# Patient Record
Sex: FEMALE | Race: ASIAN | NOT HISPANIC OR LATINO
[De-identification: names, ages, dates, MRNs, and addresses within clinical notes are randomized per-mention and may not be internally consistent; named-entity substitution may affect disease eponyms.]

---

## 2024-04-02 ENCOUNTER — NON-APPOINTMENT (OUTPATIENT)
Age: 79
End: 2024-04-02

## 2024-04-03 ENCOUNTER — INPATIENT (INPATIENT)
Facility: HOSPITAL | Age: 79
LOS: 1 days | Discharge: HOME | End: 2024-04-05
Attending: HOSPITALIST | Admitting: HOSPITALIST
Payer: MEDICARE

## 2024-04-03 VITALS
OXYGEN SATURATION: 99 % | HEART RATE: 68 BPM | TEMPERATURE: 98 F | DIASTOLIC BLOOD PRESSURE: 91 MMHG | SYSTOLIC BLOOD PRESSURE: 149 MMHG | RESPIRATION RATE: 18 BRPM

## 2024-04-03 LAB
ALBUMIN SERPL ELPH-MCNC: 3.7 G/DL — SIGNIFICANT CHANGE UP (ref 3.3–5)
ALP SERPL-CCNC: 86 U/L — SIGNIFICANT CHANGE UP (ref 40–120)
ALT FLD-CCNC: 12 U/L — SIGNIFICANT CHANGE UP (ref 4–33)
ANION GAP SERPL CALC-SCNC: 11 MMOL/L — SIGNIFICANT CHANGE UP (ref 7–14)
AST SERPL-CCNC: 19 U/L — SIGNIFICANT CHANGE UP (ref 4–32)
BASOPHILS # BLD AUTO: 0.02 K/UL — SIGNIFICANT CHANGE UP (ref 0–0.2)
BASOPHILS NFR BLD AUTO: 0.5 % — SIGNIFICANT CHANGE UP (ref 0–2)
BILIRUB SERPL-MCNC: <0.2 MG/DL — SIGNIFICANT CHANGE UP (ref 0.2–1.2)
BUN SERPL-MCNC: 27 MG/DL — HIGH (ref 7–23)
CALCIUM SERPL-MCNC: 9.1 MG/DL — SIGNIFICANT CHANGE UP (ref 8.4–10.5)
CHLORIDE SERPL-SCNC: 106 MMOL/L — SIGNIFICANT CHANGE UP (ref 98–107)
CO2 SERPL-SCNC: 21 MMOL/L — LOW (ref 22–31)
CREAT SERPL-MCNC: 0.91 MG/DL — SIGNIFICANT CHANGE UP (ref 0.5–1.3)
EGFR: 65 ML/MIN/1.73M2 — SIGNIFICANT CHANGE UP
EOSINOPHIL # BLD AUTO: 0.06 K/UL — SIGNIFICANT CHANGE UP (ref 0–0.5)
EOSINOPHIL NFR BLD AUTO: 1.4 % — SIGNIFICANT CHANGE UP (ref 0–6)
GLUCOSE SERPL-MCNC: 96 MG/DL — SIGNIFICANT CHANGE UP (ref 70–99)
HCT VFR BLD CALC: 36.9 % — SIGNIFICANT CHANGE UP (ref 34.5–45)
HGB BLD-MCNC: 11.7 G/DL — SIGNIFICANT CHANGE UP (ref 11.5–15.5)
IANC: 1.72 K/UL — LOW (ref 1.8–7.4)
IMM GRANULOCYTES NFR BLD AUTO: 0.2 % — SIGNIFICANT CHANGE UP (ref 0–0.9)
LIDOCAIN IGE QN: 21 U/L — SIGNIFICANT CHANGE UP (ref 7–60)
LYMPHOCYTES # BLD AUTO: 2.26 K/UL — SIGNIFICANT CHANGE UP (ref 1–3.3)
LYMPHOCYTES # BLD AUTO: 51.7 % — HIGH (ref 13–44)
MCHC RBC-ENTMCNC: 28.5 PG — SIGNIFICANT CHANGE UP (ref 27–34)
MCHC RBC-ENTMCNC: 31.7 GM/DL — LOW (ref 32–36)
MCV RBC AUTO: 90 FL — SIGNIFICANT CHANGE UP (ref 80–100)
MONOCYTES # BLD AUTO: 0.3 K/UL — SIGNIFICANT CHANGE UP (ref 0–0.9)
MONOCYTES NFR BLD AUTO: 6.9 % — SIGNIFICANT CHANGE UP (ref 2–14)
NEUTROPHILS # BLD AUTO: 1.72 K/UL — LOW (ref 1.8–7.4)
NEUTROPHILS NFR BLD AUTO: 39.3 % — LOW (ref 43–77)
NRBC # BLD: 0 /100 WBCS — SIGNIFICANT CHANGE UP (ref 0–0)
NRBC # FLD: 0 K/UL — SIGNIFICANT CHANGE UP (ref 0–0)
NT-PROBNP SERPL-SCNC: 311 PG/ML — HIGH
PLATELET # BLD AUTO: 165 K/UL — SIGNIFICANT CHANGE UP (ref 150–400)
POTASSIUM SERPL-MCNC: 4.1 MMOL/L — SIGNIFICANT CHANGE UP (ref 3.5–5.3)
POTASSIUM SERPL-SCNC: 4.1 MMOL/L — SIGNIFICANT CHANGE UP (ref 3.5–5.3)
PROT SERPL-MCNC: 6.4 G/DL — SIGNIFICANT CHANGE UP (ref 6–8.3)
RBC # BLD: 4.1 M/UL — SIGNIFICANT CHANGE UP (ref 3.8–5.2)
RBC # FLD: 15.1 % — HIGH (ref 10.3–14.5)
SODIUM SERPL-SCNC: 138 MMOL/L — SIGNIFICANT CHANGE UP (ref 135–145)
T3 SERPL-MCNC: 87 NG/DL — SIGNIFICANT CHANGE UP (ref 80–200)
T4 AB SER-ACNC: 6.45 UG/DL — SIGNIFICANT CHANGE UP (ref 5.1–13)
TROPONIN T, HIGH SENSITIVITY RESULT: 12 NG/L — SIGNIFICANT CHANGE UP
TSH SERPL-MCNC: 0.44 UIU/ML — SIGNIFICANT CHANGE UP (ref 0.27–4.2)
WBC # BLD: 4.37 K/UL — SIGNIFICANT CHANGE UP (ref 3.8–10.5)
WBC # FLD AUTO: 4.37 K/UL — SIGNIFICANT CHANGE UP (ref 3.8–10.5)

## 2024-04-03 PROCEDURE — 71046 X-RAY EXAM CHEST 2 VIEWS: CPT | Mod: 26

## 2024-04-03 PROCEDURE — 99285 EMERGENCY DEPT VISIT HI MDM: CPT

## 2024-04-03 NOTE — ED ADULT TRIAGE NOTE - CHIEF COMPLAINT QUOTE
Pt. c/o chest pain and palpitations 4 days ago. Symptoms have improved but today had generalized weakness. Sent from urgent care for evaluation. Pmhx: HTN Pt. c/o chest pain and palpitations 4 days ago. Symptoms have improved but today had generalized weakness. Sent from urgent care for evaluation. Pmhx: HTN   given ASA with EMS

## 2024-04-03 NOTE — ED ADULT NURSE NOTE - OBJECTIVE STATEMENT
Pt arrives to room 27 A&Ox3 and ambulatory at baseline. Pt speaks english and Spanish, family at bedside for translation. PH of HTN. Pt comes to ED c/o palpitations. Pt states she was shopping at [employer] when she returned home in the afternoon, started experiencing palpitations that have since subsided. Pt endorsing dizziness and non-radiating mid epigastric pain. Denies headache, chest pain, SOB, fevers, chills, nausea, vomiting and diarrhea at this time. Respirations even and unlabored on room air. Pt placed on continuous cardiac monitor. whoactually tech notified of pt HR lola to 40's. MD Harley made aware. Repeat EKG performed at this time. Pt arrives with 20 gauge IV to L hand. Labs drawn and sent. Plan of care ongoing, comfort measures provided and safety measures maintained. Awaiting XR.

## 2024-04-03 NOTE — ED PROVIDER NOTE - ATTENDING CONTRIBUTION TO CARE
78yo F ho htn on norvasc pw intermittent palpitations and chest discomfort   feels like he heart is racing at times  currently without symptoms  ekg with sinus lola  will check labs, tsh, trop, reassess  pt HR at times jumps to 90s  will likely admit to tele for further eval

## 2024-04-03 NOTE — ED ADULT NURSE NOTE - CHIEF COMPLAINT QUOTE
Pt. c/o chest pain and palpitations 4 days ago. Symptoms have improved but today had generalized weakness. Sent from urgent care for evaluation. Pmhx: HTN   given ASA with EMS

## 2024-04-03 NOTE — ED PROVIDER NOTE - CLINICAL SUMMARY MEDICAL DECISION MAKING FREE TEXT BOX
79-year-old female past medical history of hypertension on Norvasc, presents to the ED complaining of intermittent palpitations and chest pressure.  States that she is woken up 3 times from her sleep with palpitations with associated chills on waking up.  Chest discomfort nonexertional, nonpleuritic. no alleviating or exacerbating factors. Denies chest pain, shortness of breath, fevers, chills, nausea, vomiting, diarrhea.  Denies associated diaphoresis.     VS stable, bradycardic to 50's BP normotensive. Clinically stable. EKG sinus lola w premature atrial complexes w no ST elevations or T wave inversions. PE, well appearing, no acute distress, AAOx3. NCAT, EOMI, normal conjunctiva, mucous membranes moist, LCTAB no w/r/c, no MRG, rate intermittently increasing to 90s while examining pt, subsequently decreasing to 50's but maintaining p waves, abd NDNT, no rebound tenderness or guarding, no CVA ttp, no focal neuro deficits, neurovascularly intact, dp 2+, no bruising, rashes, or erythema. Suspicion for sick sinus syndrome vs paroxysmal afib. will assess w cbc cmp trop bnp tsh cxr tsh. dispo likely admission to tele for EP consult.

## 2024-04-03 NOTE — ED ADULT TRIAGE NOTE - NS ED NURSE BANDS TYPE
Patient mother griselda Richardson) request xray results be sent to Dr. Fernando Sanon office. We didn't refer patient there and therefore a medical release would need to be completed before release of information. Ro Fishman will stop by office.     Phone # Dr. Herman Quezada is [phone]    No referral is required per Hayley/referrals
Name band;

## 2024-04-03 NOTE — ED PROVIDER NOTE - PHYSICAL EXAMINATION
Gen: AAOx3, non-toxic, no acute distress resting comfortably.   Head: NCAT  HEENT: EOMI, oral mucosa moist, normal conjunctiva  Lung: CTAB, no respiratory distress, no wheezes/rhonchi/rales B/L,   CV: no murmurs, rubs or gallops. rate intermittently increasing to 90s upon examination, maintaining P waves on monitor, then dropping to 50's.   Abd: soft, NTND, no guarding, no CVA tenderness  MSK: no visible deformities  Neuro: No focal sensory or motor deficits  Skin: Warm, well perfused, no rash  Psych: normal affect.

## 2024-04-03 NOTE — ED PROVIDER NOTE - OBJECTIVE STATEMENT
79-year-old female past medical history of hypertension on Norvasc, presents to the ED complaining of intermittent palpitations and chest pressure.  States that she is woken up 3 times from her sleep with palpitations with associated chills on waking up.  Chest discomfort nonexertional, nonpleuritic. no alleviating or exacerbating factors. Denies chest pain, shortness of breath, fevers, chills, nausea, vomiting, diarrhea.  Denies associated diaphoresis.

## 2024-04-04 ENCOUNTER — RESULT REVIEW (OUTPATIENT)
Age: 79
End: 2024-04-04

## 2024-04-04 DIAGNOSIS — R00.2 PALPITATIONS: ICD-10-CM

## 2024-04-04 DIAGNOSIS — I10 ESSENTIAL (PRIMARY) HYPERTENSION: ICD-10-CM

## 2024-04-04 LAB
ALBUMIN SERPL ELPH-MCNC: 3.9 G/DL — SIGNIFICANT CHANGE UP (ref 3.3–5)
ALP SERPL-CCNC: 77 U/L — SIGNIFICANT CHANGE UP (ref 40–120)
ALT FLD-CCNC: 8 U/L — SIGNIFICANT CHANGE UP (ref 4–33)
ANION GAP SERPL CALC-SCNC: 12 MMOL/L — SIGNIFICANT CHANGE UP (ref 7–14)
APTT BLD: 30.9 SEC — SIGNIFICANT CHANGE UP (ref 24.5–35.6)
AST SERPL-CCNC: 20 U/L — SIGNIFICANT CHANGE UP (ref 4–32)
BASOPHILS # BLD AUTO: 0.02 K/UL — SIGNIFICANT CHANGE UP (ref 0–0.2)
BASOPHILS NFR BLD AUTO: 0.4 % — SIGNIFICANT CHANGE UP (ref 0–2)
BILIRUB SERPL-MCNC: <0.2 MG/DL — SIGNIFICANT CHANGE UP (ref 0.2–1.2)
BLD GP AB SCN SERPL QL: NEGATIVE — SIGNIFICANT CHANGE UP
BUN SERPL-MCNC: 26 MG/DL — HIGH (ref 7–23)
CALCIUM SERPL-MCNC: 9.4 MG/DL — SIGNIFICANT CHANGE UP (ref 8.4–10.5)
CHLORIDE SERPL-SCNC: 108 MMOL/L — HIGH (ref 98–107)
CO2 SERPL-SCNC: 20 MMOL/L — LOW (ref 22–31)
CREAT SERPL-MCNC: 0.66 MG/DL — SIGNIFICANT CHANGE UP (ref 0.5–1.3)
EGFR: 90 ML/MIN/1.73M2 — SIGNIFICANT CHANGE UP
EOSINOPHIL # BLD AUTO: 0.08 K/UL — SIGNIFICANT CHANGE UP (ref 0–0.5)
EOSINOPHIL NFR BLD AUTO: 1.5 % — SIGNIFICANT CHANGE UP (ref 0–6)
GLUCOSE SERPL-MCNC: 91 MG/DL — SIGNIFICANT CHANGE UP (ref 70–99)
HCT VFR BLD CALC: 37.8 % — SIGNIFICANT CHANGE UP (ref 34.5–45)
HGB BLD-MCNC: 12 G/DL — SIGNIFICANT CHANGE UP (ref 11.5–15.5)
IANC: 2.17 K/UL — SIGNIFICANT CHANGE UP (ref 1.8–7.4)
IMM GRANULOCYTES NFR BLD AUTO: 0.4 % — SIGNIFICANT CHANGE UP (ref 0–0.9)
INR BLD: 1.03 RATIO — SIGNIFICANT CHANGE UP (ref 0.85–1.18)
LYMPHOCYTES # BLD AUTO: 2.8 K/UL — SIGNIFICANT CHANGE UP (ref 1–3.3)
LYMPHOCYTES # BLD AUTO: 51.5 % — HIGH (ref 13–44)
MAGNESIUM SERPL-MCNC: 2.3 MG/DL — SIGNIFICANT CHANGE UP (ref 1.6–2.6)
MCHC RBC-ENTMCNC: 28.2 PG — SIGNIFICANT CHANGE UP (ref 27–34)
MCHC RBC-ENTMCNC: 31.7 GM/DL — LOW (ref 32–36)
MCV RBC AUTO: 88.9 FL — SIGNIFICANT CHANGE UP (ref 80–100)
MONOCYTES # BLD AUTO: 0.35 K/UL — SIGNIFICANT CHANGE UP (ref 0–0.9)
MONOCYTES NFR BLD AUTO: 6.4 % — SIGNIFICANT CHANGE UP (ref 2–14)
NEUTROPHILS # BLD AUTO: 2.17 K/UL — SIGNIFICANT CHANGE UP (ref 1.8–7.4)
NEUTROPHILS NFR BLD AUTO: 39.8 % — LOW (ref 43–77)
NRBC # BLD: 0 /100 WBCS — SIGNIFICANT CHANGE UP (ref 0–0)
NRBC # FLD: 0 K/UL — SIGNIFICANT CHANGE UP (ref 0–0)
PHOSPHATE SERPL-MCNC: 3.2 MG/DL — SIGNIFICANT CHANGE UP (ref 2.5–4.5)
PLATELET # BLD AUTO: 174 K/UL — SIGNIFICANT CHANGE UP (ref 150–400)
POTASSIUM SERPL-MCNC: 4.3 MMOL/L — SIGNIFICANT CHANGE UP (ref 3.5–5.3)
POTASSIUM SERPL-SCNC: 4.3 MMOL/L — SIGNIFICANT CHANGE UP (ref 3.5–5.3)
PROT SERPL-MCNC: 6.8 G/DL — SIGNIFICANT CHANGE UP (ref 6–8.3)
PROTHROM AB SERPL-ACNC: 11.6 SEC — SIGNIFICANT CHANGE UP (ref 9.5–13)
RBC # BLD: 4.25 M/UL — SIGNIFICANT CHANGE UP (ref 3.8–5.2)
RBC # FLD: 15.3 % — HIGH (ref 10.3–14.5)
RH IG SCN BLD-IMP: POSITIVE — SIGNIFICANT CHANGE UP
SODIUM SERPL-SCNC: 140 MMOL/L — SIGNIFICANT CHANGE UP (ref 135–145)
TROPONIN T, HIGH SENSITIVITY RESULT: 12 NG/L — SIGNIFICANT CHANGE UP
WBC # BLD: 5.44 K/UL — SIGNIFICANT CHANGE UP (ref 3.8–10.5)
WBC # FLD AUTO: 5.44 K/UL — SIGNIFICANT CHANGE UP (ref 3.8–10.5)

## 2024-04-04 PROCEDURE — 93306 TTE W/DOPPLER COMPLETE: CPT | Mod: 26

## 2024-04-04 PROCEDURE — 99223 1ST HOSP IP/OBS HIGH 75: CPT

## 2024-04-04 RX ORDER — ASPIRIN/CALCIUM CARB/MAGNESIUM 324 MG
324 TABLET ORAL ONCE
Refills: 0 | Status: COMPLETED | OUTPATIENT
Start: 2024-04-04 | End: 2024-04-04

## 2024-04-04 RX ORDER — AMLODIPINE BESYLATE 2.5 MG/1
5 TABLET ORAL DAILY
Refills: 0 | Status: DISCONTINUED | OUTPATIENT
Start: 2024-04-04 | End: 2024-04-05

## 2024-04-04 RX ORDER — AMLODIPINE BESYLATE 2.5 MG/1
1 TABLET ORAL
Refills: 0 | DISCHARGE

## 2024-04-04 RX ORDER — HEPARIN SODIUM 5000 [USP'U]/ML
5000 INJECTION INTRAVENOUS; SUBCUTANEOUS EVERY 12 HOURS
Refills: 0 | Status: DISCONTINUED | OUTPATIENT
Start: 2024-04-04 | End: 2024-04-05

## 2024-04-04 RX ORDER — ACETAMINOPHEN 500 MG
650 TABLET ORAL EVERY 6 HOURS
Refills: 0 | Status: DISCONTINUED | OUTPATIENT
Start: 2024-04-04 | End: 2024-04-05

## 2024-04-04 RX ORDER — ATORVASTATIN CALCIUM 80 MG/1
20 TABLET, FILM COATED ORAL AT BEDTIME
Refills: 0 | Status: DISCONTINUED | OUTPATIENT
Start: 2024-04-04 | End: 2024-04-05

## 2024-04-04 RX ADMIN — Medication 650 MILLIGRAM(S): at 15:20

## 2024-04-04 RX ADMIN — ATORVASTATIN CALCIUM 20 MILLIGRAM(S): 80 TABLET, FILM COATED ORAL at 00:45

## 2024-04-04 RX ADMIN — HEPARIN SODIUM 5000 UNIT(S): 5000 INJECTION INTRAVENOUS; SUBCUTANEOUS at 06:25

## 2024-04-04 RX ADMIN — HEPARIN SODIUM 5000 UNIT(S): 5000 INJECTION INTRAVENOUS; SUBCUTANEOUS at 18:16

## 2024-04-04 RX ADMIN — ATORVASTATIN CALCIUM 20 MILLIGRAM(S): 80 TABLET, FILM COATED ORAL at 21:46

## 2024-04-04 RX ADMIN — AMLODIPINE BESYLATE 5 MILLIGRAM(S): 2.5 TABLET ORAL at 06:25

## 2024-04-04 RX ADMIN — Medication 324 MILLIGRAM(S): at 00:45

## 2024-04-04 RX ADMIN — Medication 650 MILLIGRAM(S): at 14:52

## 2024-04-04 NOTE — CONSULT NOTE ADULT - SUBJECTIVE AND OBJECTIVE BOX
C A R D I O L O G Y  *********************    DATE OF SERVICE: 04-04-24    HISTORY OF PRESENT ILLNESS: HPI:   Pt is a 79-year-old female hx HTN, hyperthyroidism prior (now resolved), here w/ palpitations and chest pressure.     Patient was at urgent care yesterday and was send in for bradycardia and chest pressure. She also reports palpations at the same time. Reports associated dizziness and lightheadedness. Denies any syncope, reports bradycardia and dry skin. Chest pressure has now resolved, no associated SOB, orthopnea, PND or LE edema      PAST MEDICAL & SURGICAL HISTORY:  HTN  H/O hyperthyroidism  No significant past surgical history      MEDICATIONS:  MEDICATIONS  (STANDING):  amLODIPine   Tablet 5 milliGRAM(s) Oral daily  atorvastatin 20 milliGRAM(s) Oral at bedtime  heparin   Injectable 5000 Unit(s) SubCutaneous every 12 hours      Allergies:No Known Allergies    FAMILY HISTORY: No pertinent family history in first degree relatives    SOCIAL HISTORY:    [X ] Non-smoker  [ ] Smoker  [ ] Alcohol    FLU VACCINE THIS YEAR STARTS IN AUGUST:  [ ] Yes    [ ] No    IF OVER 65 HAVE YOU EVER HAD A PNA VACCINE:  [ ] Yes    [ ] No       [ ] N/A      REVIEW OF SYSTEMS:  [ ]chest pain  [  ]shortness of breath  [  ]palpitations  [  ]syncope  [ ]near syncope [ ]upper extremity weakness   [ ] lower extremity weakness  [  ]diplopia  [  ]altered mental status   [  ]fevers  [ ]chills [ ]nausea  [ ]vomiting  [  ]dysphagia    [ ]abdominal pain  [ ]melena  [ ]BRBPR    [  ]epistaxis  [  ]rash    [ ]lower extremity edema    [X] All others negative	  [ ] Unable to obtain      LABS:	 	    CARDIAC MARKERS:                    12.0   5.44  )-----------( 174      ( 04 Apr 2024 06:31 )             37.8     Hb Trend: 12.0<--, 11.7<--    04-04    140  |  108<H>  |  26<H>  ----------------------------<  91  4.3   |  20<L>  |  0.66    Ca    9.4      04 Apr 2024 06:31  Phos  3.2     04-04  Mg     2.30     04-04    TPro  6.8  /  Alb  3.9  /  TBili  <0.2  /  DBili  x   /  AST  20  /  ALT  8   /  AlkPhos  77  04-04    Creatinine Trend: 0.66<--, 0.91<--    Coags:  PT/INR - ( 04 Apr 2024 06:31 )   PT: 11.6 sec;   INR: 1.03 ratio         PTT - ( 04 Apr 2024 06:31 )  PTT:30.9 sec  TSH: Thyroid Stimulating Hormone, Serum: 0.44 uIU/mL (04-03 @ 22:08)          PHYSICAL EXAM:  T(C): 36.7 (04-04-24 @ 09:41), Max: 37.1 (04-03-24 @ 21:08)  HR: 52 (04-04-24 @ 09:41) (46 - 68)  BP: 114/54 (04-04-24 @ 09:41) (114/54 - 157/71)  RR: 17 (04-04-24 @ 09:41) (16 - 18)  SpO2: 96% (04-04-24 @ 09:41) (96% - 100%)  Wt(kg): --     I&O's Summary    General:  Alert and Oriented * 3.   Head: Normocephalic and atraumatic.   Neck: No JVD. No bruits. Supple. Does not appear to be enlarged.   Cardiovascular: + S1,S2 ; RRR Soft systolic murmur at the left lower sternal border. No rubs noted.    Lungs: CTA b/l. No rhonchi, rales or wheezes.   Abdomen: + BS, soft. Non tender. Non distended. No rebound. No guarding.   Extremities: No clubbing/cyanosis/edema.   Neurologic: Moves all four extremities. Full range of motion.   Skin: Warm and moist. Psychiatric: Appropriate mood and affect.  Musculoskeletal: Normal range of motion, normal strength     TELEMETRY: 	  Sinus lola with PAC    ECG:  	Sinus with PAC    RADIOLOGY:         CXR:   < from: Xray Chest 2 Views PA/Lat (04.03.24 @ 22:42) >  FINDINGS:  Heart/Vascular: Heart size is enlarged.  Pulmonary: The lungs are clear. No pleural effusion. No pneumothorax.  Bones: No acute osseous abnormalities    IMPRESSION:  Clear lungs.    < end of copied text >      ASSESSMENT/PLAN:  Pt is a 79-year-old female hx HTN, hyperthyroidism prior (now resolved), here w/ palpitations and chest pressure. Patient was at urgent care yesterday and was send in for bradycardia and chest pressure. She also reports palpations at the same time. Reports associated dizziness and lightheadedness. Denies any syncope, reports bradycardia and dry skin. Chest pressure has now resolved, no associated SOB, orthopnea, PND or LE edema      Chest Pressure/Dizziness/Bradycardia/Palpiations  - EKG with sinus lola and PAC  - Tele with sinus lola and PAC  - TSH WNL  - Appropriate HR response to activity  - check TTE  - check orthostats  - check NST  - outpatient [facility]    Sarika Valentin MD  Pager: [phone]      C A R D I O L O G Y  *********************    DATE OF SERVICE: 04-04-24    HISTORY OF PRESENT ILLNESS: HPI:   Pt is a 79-year-old female hx HTN, hyperthyroidism prior (now resolved), here w/ palpitations and chest pressure.     Patient was at urgent care yesterday and was send in for bradycardia and chest pressure. She also reports palpations at the same time. Reports associated dizziness and lightheadedness. Denies any syncope, reports bradycardia and dry skin. Chest pressure has now resolved, no associated SOB, orthopnea, PND or LE edema      PAST MEDICAL & SURGICAL HISTORY:  HTN  H/O hyperthyroidism  No significant past surgical history      MEDICATIONS:  MEDICATIONS  (STANDING):  amLODIPine   Tablet 5 milliGRAM(s) Oral daily  atorvastatin 20 milliGRAM(s) Oral at bedtime  heparin   Injectable 5000 Unit(s) SubCutaneous every 12 hours      Allergies:No Known Allergies    FAMILY HISTORY: No pertinent family history in first degree relatives    SOCIAL HISTORY:    [X ] Non-smoker  [ ] Smoker  [ ] Alcohol    FLU VACCINE THIS YEAR STARTS IN AUGUST:  [ ] Yes    [ ] No    IF OVER 65 HAVE YOU EVER HAD A PNA VACCINE:  [ ] Yes    [ ] No       [ ] N/A      REVIEW OF SYSTEMS:  [ ]chest pain  [  ]shortness of breath  [  ]palpitations  [  ]syncope  [ ]near syncope [ ]upper extremity weakness   [ ] lower extremity weakness  [  ]diplopia  [  ]altered mental status   [  ]fevers  [ ]chills [ ]nausea  [ ]vomiting  [  ]dysphagia    [ ]abdominal pain  [ ]melena  [ ]BRBPR    [  ]epistaxis  [  ]rash    [ ]lower extremity edema    [X] All others negative	  [ ] Unable to obtain      LABS:	 	    CARDIAC MARKERS:                    12.0   5.44  )-----------( 174      ( 04 Apr 2024 06:31 )             37.8     Hb Trend: 12.0<--, 11.7<--    04-04    140  |  108<H>  |  26<H>  ----------------------------<  91  4.3   |  20<L>  |  0.66    Ca    9.4      04 Apr 2024 06:31  Phos  3.2     04-04  Mg     2.30     04-04    TPro  6.8  /  Alb  3.9  /  TBili  <0.2  /  DBili  x   /  AST  20  /  ALT  8   /  AlkPhos  77  04-04    Creatinine Trend: 0.66<--, 0.91<--    Coags:  PT/INR - ( 04 Apr 2024 06:31 )   PT: 11.6 sec;   INR: 1.03 ratio         PTT - ( 04 Apr 2024 06:31 )  PTT:30.9 sec  TSH: Thyroid Stimulating Hormone, Serum: 0.44 uIU/mL (04-03 @ 22:08)          PHYSICAL EXAM:  T(C): 36.7 (04-04-24 @ 09:41), Max: 37.1 (04-03-24 @ 21:08)  HR: 52 (04-04-24 @ 09:41) (46 - 68)  BP: 114/54 (04-04-24 @ 09:41) (114/54 - 157/71)  RR: 17 (04-04-24 @ 09:41) (16 - 18)  SpO2: 96% (04-04-24 @ 09:41) (96% - 100%)  Wt(kg): --     I&O's Summary    General:  Alert and Oriented * 3.   Head: Normocephalic and atraumatic.   Neck: No JVD. No bruits. Supple. Does not appear to be enlarged.   Cardiovascular: + S1,S2 ; RRR Soft systolic murmur at the left lower sternal border. No rubs noted.    Lungs: CTA b/l. No rhonchi, rales or wheezes.   Abdomen: + BS, soft. Non tender. Non distended. No rebound. No guarding.   Extremities: No clubbing/cyanosis/edema.   Neurologic: Moves all four extremities. Full range of motion.   Skin: Warm and moist. Psychiatric: Appropriate mood and affect.  Musculoskeletal: Normal range of motion, normal strength     TELEMETRY: 	  Sinus lola with PAC    ECG:  	Sinus with PAC    RADIOLOGY:         CXR:   < from: Xray Chest 2 Views PA/Lat (04.03.24 @ 22:42) >  FINDINGS:  Heart/Vascular: Heart size is enlarged.  Pulmonary: The lungs are clear. No pleural effusion. No pneumothorax.  Bones: No acute osseous abnormalities    IMPRESSION:  Clear lungs.    < end of copied text >      ASSESSMENT/PLAN:  Pt is a 79-year-old female hx HTN, hyperthyroidism prior (now resolved), here w/ palpitations and chest pressure. Patient was at urgent care yesterday and was send in for bradycardia and chest pressure. She also reports palpations at the same time. Reports associated dizziness and lightheadedness. Denies any syncope, reports bradycardia and dry skin. Chest pressure has now resolved, no associated SOB, orthopnea, PND or LE edema      Chest Pressure/Dizziness/Bradycardia/Palpiations/HTN  - EKG with sinus lola and PAC  - Tele with sinus lola and PAC  - TSH WNL  - Appropriate HR response to activity  - check TTE  - check orthostats  - check NST  - outpatient [facility]  - c/w Valsartan, HCTZ and Amlodipine for BP    Sarika Valentin MD  Pager: [phone]      C A R D I O L O G Y  *********************    DATE OF SERVICE: 04-04-24    HISTORY OF PRESENT ILLNESS: HPI:   Pt is a 79-year-old female hx HTN, hyperthyroidism prior (now resolved), here w/ palpitations and chest pressure.     Patient was at urgent care yesterday and was send in for bradycardia and chest pressure. She also reports palpations at the same time. Reports associated dizziness and lightheadedness. Denies any syncope, reports bradycardia and dry skin. Chest pressure has now resolved, no associated SOB, orthopnea, PND or LE edema      PAST MEDICAL & SURGICAL HISTORY:  HTN  H/O hyperthyroidism  No significant past surgical history      MEDICATIONS:  MEDICATIONS  (STANDING):  amLODIPine   Tablet 5 milliGRAM(s) Oral daily  atorvastatin 20 milliGRAM(s) Oral at bedtime  heparin   Injectable 5000 Unit(s) SubCutaneous every 12 hours      Allergies:No Known Allergies    FAMILY HISTORY: No pertinent family history in first degree relatives    SOCIAL HISTORY:    [X ] Non-smoker  [ ] Smoker  [ ] Alcohol    FLU VACCINE THIS YEAR STARTS IN AUGUST:  [ ] Yes    [ ] No    IF OVER 65 HAVE YOU EVER HAD A PNA VACCINE:  [ ] Yes    [ ] No       [ ] N/A      REVIEW OF SYSTEMS:  [ ]chest pain  [  ]shortness of breath  [  ]palpitations  [  ]syncope  [ ]near syncope [ ]upper extremity weakness   [ ] lower extremity weakness  [  ]diplopia  [  ]altered mental status   [  ]fevers  [ ]chills [ ]nausea  [ ]vomiting  [  ]dysphagia    [ ]abdominal pain  [ ]melena  [ ]BRBPR    [  ]epistaxis  [  ]rash    [ ]lower extremity edema    [X] All others negative	  [ ] Unable to obtain      LABS:	 	    CARDIAC MARKERS:                    12.0   5.44  )-----------( 174      ( 04 Apr 2024 06:31 )             37.8     Hb Trend: 12.0<--, 11.7<--    04-04    140  |  108<H>  |  26<H>  ----------------------------<  91  4.3   |  20<L>  |  0.66    Ca    9.4      04 Apr 2024 06:31  Phos  3.2     04-04  Mg     2.30     04-04    TPro  6.8  /  Alb  3.9  /  TBili  <0.2  /  DBili  x   /  AST  20  /  ALT  8   /  AlkPhos  77  04-04    Creatinine Trend: 0.66<--, 0.91<--    Coags:  PT/INR - ( 04 Apr 2024 06:31 )   PT: 11.6 sec;   INR: 1.03 ratio         PTT - ( 04 Apr 2024 06:31 )  PTT:30.9 sec  TSH: Thyroid Stimulating Hormone, Serum: 0.44 uIU/mL (04-03 @ 22:08)          PHYSICAL EXAM:  T(C): 36.7 (04-04-24 @ 09:41), Max: 37.1 (04-03-24 @ 21:08)  HR: 52 (04-04-24 @ 09:41) (46 - 68)  BP: 114/54 (04-04-24 @ 09:41) (114/54 - 157/71)  RR: 17 (04-04-24 @ 09:41) (16 - 18)  SpO2: 96% (04-04-24 @ 09:41) (96% - 100%)  Wt(kg): --     I&O's Summary    General:  Alert and Oriented * 3.   Head: Normocephalic and atraumatic.   Neck: No JVD. No bruits. Supple. Does not appear to be enlarged.   Cardiovascular: + S1,S2 ; RRR Soft systolic murmur at the left lower sternal border. No rubs noted.    Lungs: CTA b/l. No rhonchi, rales or wheezes.   Abdomen: + BS, soft. Non tender. Non distended. No rebound. No guarding.   Extremities: No clubbing/cyanosis/edema.   Neurologic: Moves all four extremities. Full range of motion.   Skin: Warm and moist. Psychiatric: Appropriate mood and affect.  Musculoskeletal: Normal range of motion, normal strength     TELEMETRY: 	  Sinus lola with PAC    ECG:  	Sinus with PAC    RADIOLOGY:         CXR:   < from: Xray Chest 2 Views PA/Lat (04.03.24 @ 22:42) >  FINDINGS:  Heart/Vascular: Heart size is enlarged.  Pulmonary: The lungs are clear. No pleural effusion. No pneumothorax.  Bones: No acute osseous abnormalities    IMPRESSION:  Clear lungs.    < end of copied text >      ASSESSMENT/PLAN:  Pt is a 79-year-old female hx HTN, hyperthyroidism prior (now resolved), here w/ palpitations and chest pressure. Patient was at urgent care yesterday and was send in for bradycardia and chest pressure. She also reports palpations at the same time. Reports associated dizziness and lightheadedness. Denies any syncope, reports bradycardia and dry skin. Chest pressure has now resolved, no associated SOB, orthopnea, PND or LE edema      Chest Pressure/Dizziness/Bradycardia/Palpiations/HTN/HLD  - EKG with sinus lola and PAC  - Tele with sinus lola and PAC  - TSH WNL  - Appropriate HR response to activity  - check TTE  - check orthostats  - check NST  - outpatient [facility]  - c/w Valsartan, HCTZ and Amlodipine for BP  - c/w Statin    Sarika Valentin MD  Pager: [phone]

## 2024-04-04 NOTE — CONSULT NOTE ADULT - SUBJECTIVE AND OBJECTIVE BOX
Abdominal Pain   WHAT YOU NEED TO KNOW:   Abdominal pain can be dull, achy, or sharp  You may have pain in one area of your abdomen, or in your entire abdomen  Your pain may be caused by a condition such as constipation, food sensitivity or poisoning, infection, or a blockage  Abdominal pain can also be from a hernia, appendicitis, or an ulcer  Liver, gallbladder, or kidney conditions can also cause abdominal pain  The cause of your abdominal pain may be unknown  DISCHARGE INSTRUCTIONS:   Return to the emergency department if:   · You have new chest pain or shortness of breath  · You have pulsing pain in your upper abdomen or lower back that suddenly becomes constant  · Your pain is in the right lower abdominal area and worsens with movement  · You have a fever over 100 4°F (38°C) or shaking chills  · You are vomiting and cannot keep food or liquids down  · Your pain does not improve or gets worse over the next 8 to 12 hours  · You see blood in your vomit or bowel movements, or they look black and tarry  · Your skin or the whites of your eyes turn yellow  · You are a woman and have a large amount of vaginal bleeding that is not your monthly period  Contact your healthcare provider if:   · You have pain in your lower back  · You are a man and have pain in your testicles  · You have pain when you urinate  · You have questions or concerns about your condition or care  Follow up with your healthcare provider within 24 hours or as directed:  Write down your questions so you remember to ask them during your visits  Medicines:   · Medicines  may be given to calm your stomach and prevent vomiting or to decrease pain  Ask how to take pain medicine safely  · Take your medicine as directed  Contact your healthcare provider if you think your medicine is not helping or if you have side effects  Tell him of her if you are allergic to any medicine   Keep a list of the medicines, EP Attending  HISTORY OF PRESENT ILLNESS: HPI:   79-year-old female hx HTN, hyperthyroidism prior (now resolved), here w/ palpitations and chest pressure. Patient had this happen 10 days ago and 4 days ago. Has been waking up due to palpitations. She had mild chest pressure that lasted <15 minutes (4 d ago) that was left sided. ROS is otherwise negative.  ED course: EKG sinus bradycardic w/ Qtc prolongation 478, HR ranged 40s-low 50s. Troponin negative. TSH/t4 wnl.   (04 Apr 2024 04:23)    Patient aware of pounding slow heartbeat. No fainting or lightheadedness but does feel fatigued.    Takes medication for hypertension but is not on any diltiazem or beta blockers.  Not listed as taking any thyroid-suppressive RX, and her thyroid function is not abnormal.  A 10 pt ROS is otherwise negative.    PAST MEDICAL & SURGICAL HISTORY:  Mild HTN  H/O hyperthyroidism  No significant past surgical history      MEDICATIONS  (STANDING):  amLODIPine   Tablet 5 milliGRAM(s) Oral daily  atorvastatin 20 milliGRAM(s) Oral at bedtime  heparin   Injectable 5000 Unit(s) SubCutaneous every 12 hours    Allergies    No Known Allergies    Intolerances    FAMILY HISTORY:  No pertinent family history in first degree relatives      Non-contributary for premature coronary disease or sudden cardiac death    SOCIAL HISTORY:    [x ] Non-smoker  [ ] Smoker  [ ] Alcohol      PHYSICAL EXAM:  T(C): 36.7 (04-04-24 @ 09:41), Max: 37.1 (04-03-24 @ 21:08)  HR: 52 (04-04-24 @ 09:41) (46 - 68)  BP: 114/54 (04-04-24 @ 09:41) (114/54 - 157/71)  RR: 17 (04-04-24 @ 09:41) (16 - 18)  SpO2: 96% (04-04-24 @ 09:41) (96% - 100%)  Wt(kg): --    Appearance: elderly woman in no acute distress	  HEENT:   Normal oral mucosa, PERRL, EOMI	  Lymphatic: No lymphadenopathy , no edema  Cardiovascular:  slow regular S1 S2, No JVD, No murmurs , Peripheral pulses palpable 2+ bilaterally  Respiratory: Lungs clear to auscultation, normal effort 	  Gastrointestinal:  Soft, Non-tender, + BS	  Skin: No rashes, No ecchymoses, No cyanosis, warm to touch  Musculoskeletal: Normal range of motion, normal strength  Psychiatry:  Mood & affect appropriate      TELEMETRY: sinus lola 45-55bpm 	    ECG: sinus lola 50bpm.  QT upper limits of normal.  short burst of PAT.  Echo:    1. Left ventricular cavity is normal in size. Left ventricular wall thickness is normal. Left ventricular systolic function is normal with an ejection fraction of 65 % by Farr's method of disks. There are no regional wall motion abnormalities seen.   2. Normal right ventricular cavity size and probably normal systolic function.   3. Structurally normal mitral valve with normal leaflet excursion. There is calcification of the mitral valve annulus. There is mild to moderate mitral regurgitation.   4. The left atrium is moderately dilated with an indexed volume of 47 ml/m².    ________________________________________________________________________________________  FINDINGS:     Left Ventricle:  The left ventricular cavity is normal in size. Left ventricular wall thickness is normal. Left ventricular systolic function is normal with a calculated ejection fraction of 65 % by the Farr's biplane method of disks. There are no regional wall motion abnormalities seen.     Right Ventricle:  The right ventricle is not well visualized. The right ventricular cavity is normal in size and probably normal systolic function. Tricuspid annular plane systolic excursion (TAPSE) is 1.9 cm (normal >=1.7 cm).     Left Atrium:  The left atrium is moderately dilated with an indexed volume of 47 ml/m².     Right Atrium:  The right atrium is dilated with an indexed area of 9.51 cm²/m².     Aortic Valve:  The aortic valve appears trileaflet with normal systolic excursion. There is calcification of the aortic valve leaflets. There is no evidence of aortic regurgitation.     Mitral Valve:  Structurally normal mitral valve with normal leaflet excursion. There is calcification of the mitral valve annulus. There is mild to moderate mitral regurgitation.     Tricuspid Valve:  Structurally normal tricuspid valve with normal leaflet excursion. There is mild tricuspid regurgitation.     Pulmonic Valve:  Structurally normal pulmonic valve with normal leaflet excursion. There is trace pulmonic regurgitation.     Aorta:  The aortic root at the sinuses of Valsalva is normal in size, measuring 2.90 cm (indexed 1.65 cm/m²). The ascending aorta diameter is dilated, measuring 4.00 cm (indexed 2.27 cm/m²).     Pericardium:  No pericardial effusion seen.     Systemic Veins:  The inferior vena cava was not well visualized.    LABS:	 	                          12.0   5.44  )-----------( 174      ( 04 Apr 2024 06:31 )             37.8     04-04    140  |  108<H>  |  26<H>  ----------------------------<  91  4.3   |  20<L>  |  0.66    Ca    9.4      04 Apr 2024 06:31  Phos  3.2     04-04  Mg     2.30     04-04    TPro  6.8  /  Alb  3.9  /  TBili  <0.2  /  DBili  x   /  AST  20  /  ALT  8   /  AlkPhos  77  04-04  TSH: Thyroid Stimulating Hormone, Serum: 0.44 uIU/mL (04-03 @ 22:08)      ASSESSMENT/PLAN: Ms Phillips is a pleasant 79y Female brought in for chest pain and low heartrate.  NOT having an acute MI, by troponins x 2.  EKG without ischemic changes.  Hx of hyperthyroidism, resolved, TSH/T4/T3 all normal now.  No sustained arrhythmia on telemetry.  With ambulation, HR goes as high as 60, albeit this was a slow walk on flat ground.  Avoid AV angel blockers.  Continue her home ARB re: dilated ascending aorta and hypertension.    Ambulate w/ physical therapy on telemetry: unclear if her heartrate is limiting her activity (symptomatic sinus node dysfunction).  I have not brought up the possibility of dual chamber pacemaker implant yet, pending an exercise eval and completion of rest of cardiac testing.  Stress testing pending per general cardiology.    Fredo Kothari M.D.  Cardiac Electrophysiology    office [phone]  pager [phone] vitamins, and herbs you take  Include the amounts, and when and why you take them  Bring the list or the pill bottles to follow-up visits  Carry your medicine list with you in case of an emergency  © 2017 [address] [employer] Information is for End User's use only and may not be sold, redistributed or otherwise used for commercial purposes  All illustrations and images included in CareNotes® are the copyrighted property of A D A M , Inc  or [employer]  The above information is an  only  It is not intended as medical advice for individual conditions or treatments  Talk to your doctor, nurse or pharmacist before following any medical regimen to see if it is safe and effective for you

## 2024-04-04 NOTE — PHARMACOTHERAPY INTERVENTION NOTE - COMMENTS
Medication history is saved as incomplete. Spoke with daughter (Rody) unsure of patient's home medication names, unable to verify meds.   She reports patient is taking one medication for HTN (doesn't know the name) and when asked if taking any medications for HLD; reports no ?   Pharmacy shows recent fill history for following meds:  - Amlodipine 10mg once daily   - Valsartan/HCTZ 160/12.5mg once daily  - Crestor 20mg once daily

## 2024-04-04 NOTE — H&P ADULT - NSHPLABSRESULTS_GEN_ALL_CORE
.  LABS:                         11.7   4.37  )-----------( 165      ( 03 Apr 2024 22:08 )             36.9     04-03    138  |  106  |  27<H>  ----------------------------<  96  4.1   |  21<L>  |  0.91    Ca    9.1      03 Apr 2024 22:08    TPro  6.4  /  Alb  3.7  /  TBili  <0.2  /  DBili  x   /  AST  19  /  ALT  12  /  AlkPhos  86  04-03      Urinalysis Basic - ( 03 Apr 2024 22:08 )    Color: x / Appearance: x / SG: x / pH: x  Gluc: 96 mg/dL / Ketone: x  / Bili: x / Urobili: x   Blood: x / Protein: x / Nitrite: x   Leuk Esterase: x / RBC: x / WBC x   Sq Epi: x / Non Sq Epi: x / Bacteria: x                RADIOLOGY, EKG & ADDITIONAL TESTS: Reviewed.

## 2024-04-04 NOTE — H&P ADULT - NSHPPHYSICALEXAM_GEN_ALL_CORE
PHYSICAL EXAM:  GENERAL: NAD, well-developed  HEAD:  Atraumatic, Normocephalic  EYES: EOMI, PERRLA, conjunctiva and sclera clear  NECK: Supple, No JVD  CHEST/LUNG: Clear to auscultation bilaterally; No wheeze  HEART: Bradycardic; No murmurs, rubs, or gallops  ABDOMEN: Soft, Nontender, Nondistended; Bowel sounds present  EXTREMITIES:  2+ Peripheral Pulses, No clubbing, cyanosis, or edema  PSYCH: AAOx3  NEUROLOGY: non-focal  SKIN: No rashes or lesions

## 2024-04-04 NOTE — H&P ADULT - ASSESSMENT
79-year-old female hx HTN, hyperthyroidism prior (now resolved), here w/ palpitations and chest pressure.

## 2024-04-04 NOTE — H&P ADULT - HISTORY OF PRESENT ILLNESS
79-year-old female hx HTN, hyperthyroidism prior (now resolved), here w/ palpitations and chest pressure. Patient had this happen 10 days ago and 4 days ago. Has been waking up due to palpitations. She had mild chest pressure that lasted <15 minutes (4 d ago) that was left sided. ROS is otherwise negative.    ED course: EKG sinus bradycardic w/ Qtc prolongation 478, HR ranged 40s-low 50s. Troponin negative. TSH/t4 wnl.

## 2024-04-04 NOTE — ED ADULT NURSE REASSESSMENT NOTE - NS ED NURSE REASSESS COMMENT FT1
break coverage rn. received report from ANGUS acosta. pt A&Ox4, per primary RN, report given to LSRG ANGUS Del Valle. safety maintained. noted to be afib on the monitor. denies complaitns at this time

## 2024-04-05 ENCOUNTER — TRANSCRIPTION ENCOUNTER (OUTPATIENT)
Age: 79
End: 2024-04-05

## 2024-04-05 ENCOUNTER — RESULT REVIEW (OUTPATIENT)
Age: 79
End: 2024-04-05

## 2024-04-05 VITALS
SYSTOLIC BLOOD PRESSURE: 151 MMHG | DIASTOLIC BLOOD PRESSURE: 70 MMHG | TEMPERATURE: 98 F | OXYGEN SATURATION: 100 % | HEART RATE: 59 BPM | RESPIRATION RATE: 17 BRPM

## 2024-04-05 LAB
ANION GAP SERPL CALC-SCNC: 13 MMOL/L — SIGNIFICANT CHANGE UP (ref 7–14)
BASOPHILS # BLD AUTO: 0.03 K/UL — SIGNIFICANT CHANGE UP (ref 0–0.2)
BASOPHILS NFR BLD AUTO: 0.5 % — SIGNIFICANT CHANGE UP (ref 0–2)
BUN SERPL-MCNC: 25 MG/DL — HIGH (ref 7–23)
CALCIUM SERPL-MCNC: 9.2 MG/DL — SIGNIFICANT CHANGE UP (ref 8.4–10.5)
CHLORIDE SERPL-SCNC: 107 MMOL/L — SIGNIFICANT CHANGE UP (ref 98–107)
CO2 SERPL-SCNC: 21 MMOL/L — LOW (ref 22–31)
CREAT SERPL-MCNC: 0.64 MG/DL — SIGNIFICANT CHANGE UP (ref 0.5–1.3)
EGFR: 91 ML/MIN/1.73M2 — SIGNIFICANT CHANGE UP
EOSINOPHIL # BLD AUTO: 0.07 K/UL — SIGNIFICANT CHANGE UP (ref 0–0.5)
EOSINOPHIL NFR BLD AUTO: 1.3 % — SIGNIFICANT CHANGE UP (ref 0–6)
GLUCOSE SERPL-MCNC: 93 MG/DL — SIGNIFICANT CHANGE UP (ref 70–99)
HCT VFR BLD CALC: 38 % — SIGNIFICANT CHANGE UP (ref 34.5–45)
HCV AB S/CO SERPL IA: 0.08 S/CO — SIGNIFICANT CHANGE UP (ref 0–0.99)
HCV AB SERPL-IMP: SIGNIFICANT CHANGE UP
HGB BLD-MCNC: 12.4 G/DL — SIGNIFICANT CHANGE UP (ref 11.5–15.5)
IANC: 2.4 K/UL — SIGNIFICANT CHANGE UP (ref 1.8–7.4)
IMM GRANULOCYTES NFR BLD AUTO: 0.4 % — SIGNIFICANT CHANGE UP (ref 0–0.9)
LYMPHOCYTES # BLD AUTO: 2.66 K/UL — SIGNIFICANT CHANGE UP (ref 1–3.3)
LYMPHOCYTES # BLD AUTO: 47.8 % — HIGH (ref 13–44)
MAGNESIUM SERPL-MCNC: 2.3 MG/DL — SIGNIFICANT CHANGE UP (ref 1.6–2.6)
MCHC RBC-ENTMCNC: 29 PG — SIGNIFICANT CHANGE UP (ref 27–34)
MCHC RBC-ENTMCNC: 32.6 GM/DL — SIGNIFICANT CHANGE UP (ref 32–36)
MCV RBC AUTO: 88.8 FL — SIGNIFICANT CHANGE UP (ref 80–100)
MONOCYTES # BLD AUTO: 0.38 K/UL — SIGNIFICANT CHANGE UP (ref 0–0.9)
MONOCYTES NFR BLD AUTO: 6.8 % — SIGNIFICANT CHANGE UP (ref 2–14)
NEUTROPHILS # BLD AUTO: 2.4 K/UL — SIGNIFICANT CHANGE UP (ref 1.8–7.4)
NEUTROPHILS NFR BLD AUTO: 43.2 % — SIGNIFICANT CHANGE UP (ref 43–77)
NRBC # BLD: 0 /100 WBCS — SIGNIFICANT CHANGE UP (ref 0–0)
NRBC # FLD: 0 K/UL — SIGNIFICANT CHANGE UP (ref 0–0)
PHOSPHATE SERPL-MCNC: 2.8 MG/DL — SIGNIFICANT CHANGE UP (ref 2.5–4.5)
PLATELET # BLD AUTO: 171 K/UL — SIGNIFICANT CHANGE UP (ref 150–400)
POTASSIUM SERPL-MCNC: 4.2 MMOL/L — SIGNIFICANT CHANGE UP (ref 3.5–5.3)
POTASSIUM SERPL-SCNC: 4.2 MMOL/L — SIGNIFICANT CHANGE UP (ref 3.5–5.3)
RBC # BLD: 4.28 M/UL — SIGNIFICANT CHANGE UP (ref 3.8–5.2)
RBC # FLD: 14.9 % — HIGH (ref 10.3–14.5)
SODIUM SERPL-SCNC: 141 MMOL/L — SIGNIFICANT CHANGE UP (ref 135–145)
WBC # BLD: 5.56 K/UL — SIGNIFICANT CHANGE UP (ref 3.8–10.5)
WBC # FLD AUTO: 5.56 K/UL — SIGNIFICANT CHANGE UP (ref 3.8–10.5)

## 2024-04-05 PROCEDURE — 93016 CV STRESS TEST SUPVJ ONLY: CPT | Mod: GC

## 2024-04-05 PROCEDURE — 78451 HT MUSCLE IMAGE SPECT SING: CPT | Mod: 26

## 2024-04-05 PROCEDURE — 93018 CV STRESS TEST I&R ONLY: CPT | Mod: GC

## 2024-04-05 RX ORDER — ATORVASTATIN CALCIUM 80 MG/1
1 TABLET, FILM COATED ORAL
Qty: 0 | Refills: 0 | DISCHARGE
Start: 2024-04-05

## 2024-04-05 RX ORDER — ATORVASTATIN CALCIUM 80 MG/1
1 TABLET, FILM COATED ORAL
Qty: 30 | Refills: 0
Start: 2024-04-05 | End: 2024-05-04

## 2024-04-05 RX ADMIN — HEPARIN SODIUM 5000 UNIT(S): 5000 INJECTION INTRAVENOUS; SUBCUTANEOUS at 05:37

## 2024-04-05 RX ADMIN — AMLODIPINE BESYLATE 5 MILLIGRAM(S): 2.5 TABLET ORAL at 05:36

## 2024-04-05 NOTE — PROGRESS NOTE ADULT - SUBJECTIVE AND OBJECTIVE BOX
DATE OF SERVICE: 04-05-24    Patient denies chest pain or shortness of breath.   Review of symptoms otherwise negative.    MEDICATIONS:  acetaminophen     Tablet .. 650 milliGRAM(s) Oral every 6 hours PRN  amLODIPine   Tablet 5 milliGRAM(s) Oral daily  atorvastatin 20 milliGRAM(s) Oral at bedtime  heparin   Injectable 5000 Unit(s) SubCutaneous every 12 hours      LABS:                        12.4   5.56  )-----------( 171      ( 05 Apr 2024 05:45 )             38.0       Hemoglobin: 12.4 g/dL (04-05 @ 05:45)  Hemoglobin: 12.0 g/dL (04-04 @ 06:31)  Hemoglobin: 11.7 g/dL (04-03 @ 22:08)      04-05    141  |  107  |  25<H>  ----------------------------<  93  4.2   |  21<L>  |  0.64    Ca    9.2      05 Apr 2024 05:45  Phos  2.8     04-05  Mg     2.30     04-05    TPro  6.8  /  Alb  3.9  /  TBili  <0.2  /  DBili  x   /  AST  20  /  ALT  8   /  AlkPhos  77  04-04    Creatinine Trend: 0.64<--, 0.66<--, 0.91<--    COAGS:           PHYSICAL EXAM:  T(C): 36.8 (04-05-24 @ 09:39), Max: 37.1 (04-04-24 @ 20:00)  HR: 57 (04-05-24 @ 09:39) (53 - 57)  BP: 133/59 (04-05-24 @ 09:39) (124/59 - 153/53)  RR: 18 (04-05-24 @ 09:39) (17 - 18)  SpO2: 96% (04-05-24 @ 09:39) (96% - 98%)  Wt(kg): --    I&O's Summary      General:  Alert and Oriented * 3.   Head: Normocephalic and atraumatic.   Neck: No JVD. No bruits. Supple. Does not appear to be enlarged.   Cardiovascular: + S1,S2 ; RRR Soft systolic murmur at the left lower sternal border. No rubs noted.    Lungs: CTA b/l. No rhonchi, rales or wheezes.   Abdomen: + BS, soft. Non tender. Non distended. No rebound. No guarding.   Extremities: No clubbing/cyanosis/edema.   Neurologic: Moves all four extremities. Full range of motion.   Skin: Warm and moist. The patient's skin has normal elasticity and good skin turgor.   Psychiatric: Appropriate mood and affect.  Musculoskeletal: Normal range of motion, normal strength       < from: TTE W or WO Ultrasound Enhancing Agent (04.04.24 @ 10:14) >  CONCLUSIONS:      1. Left ventricular cavity is normal in size. Left ventricular wall thickness is normal. Left ventricular systolic function is normal with an ejection fraction of 65 % by Farr's method of disks. There are no regional wall motion abnormalities seen.   2. Normal right ventricular cavity size and probably normal systolic function.   3. Structurally normal mitral valve with normal leaflet excursion. There is calcification of the mitral valve annulus. There is mild to moderate mitral regurgitation.   4. The left atrium is moderately dilated with an indexed volume of 47 ml/m².    < end of copied text >      < from: Nuclear Stress Test-Pharmacologic.. (04.05.24 @ 08:23) >  --------------------------------------------------------------------------------------------------------------------------------------------------------Conclusions:   1. Normal myocardial perfusion scan, with no evidence of infarction or inducible ischemia.   2. The time activity curve suggests diastolic dysfunction.. The post stress left ventricular EF is 77 %. The stress end diastolic volume is 64 ml and systolic volume is 14 ml.    < end of copied text >      TELEMETRY: 	  Sinus lola with PAC    ECG:  	Sinus with PAC    RADIOLOGY:         CXR:   < from: Xray Chest 2 Views PA/Lat (04.03.24 @ 22:42) >  FINDINGS:  Heart/Vascular: Heart size is enlarged.  Pulmonary: The lungs are clear. No pleural effusion. No pneumothorax.  Bones: No acute osseous abnormalities    IMPRESSION:  Clear lungs.    < end of copied text >      ASSESSMENT/PLAN:  Pt is a 79-year-old female hx HTN, hyperthyroidism prior (now resolved), here w/ palpitations and chest pressure. Patient was at urgent care yesterday and was send in for bradycardia and chest pressure. She also reports palpations at the same time. Reports associated dizziness and lightheadedness. Denies any syncope, reports bradycardia and dry skin. Chest pressure has now resolved, no associated SOB, orthopnea, PND or LE edema      Chest Pressure/Dizziness/Bradycardia/Palpiations/HTN/HLD  - EKG with sinus lola and PAC  - Tele with sinus lola and PAC  - TSH WNL  - Appropriate HR response to activity  - TTE normal LVEF and no RWMA  - orthostats negative  - NST with no perfusion deficits  - outpatient [facility]  - c/w Valsartan, HCTZ and Amlodipine for BP  - c/w Statin    Sarika Valentin MD  Pager: [phone]   
EP ATTENDING    tele: NSR with APCs, periods of sinus bradycardia, no significant events, no atrial fibrillation    palpitations and chest pain improving, denies syncope    DATE OF SERVICE - 04-05-24    Review of Systems:   Constitutional: [ ] fevers, [ ] chills.   Skin: [ ] dry skin. [ ] rashes.  Psychiatric: [ ] depression, [ ] anxiety.   Gastrointestinal: [ ] BRBPR, [ ] melena.   Neurological: [ ] confusion. [ ] seizures. [ ] shuffling gait.   Ears,Nose,Mouth and Throat: [ ] ear pain [ ] sore throat.   Eyes: [ ] diplopia.   Respiratory: [ ] hemoptysis. [ ] shortness of breath  Cardiovascular: See HPI above  Hematologic/Lymphatic: [ ] anemia. [ ] painful nodes. [ ] prolonged bleeding.   Genitourinary: [ ] hematuria. [ ] flank pain.   Endocrine: [ ] significant change in weight. [ ] intolerance to heat and cold.     Review of systems [ x] otherwise negative, [ ] otherwise unable to obtain    FH: no family history of sudden cardiac death in first degree relatives    SH: [ ] tobacco, [ ] alcohol, [ ] drugs    acetaminophen     Tablet .. 650 milliGRAM(s) Oral every 6 hours PRN  amLODIPine   Tablet 5 milliGRAM(s) Oral daily  atorvastatin 20 milliGRAM(s) Oral at bedtime  heparin   Injectable 5000 Unit(s) SubCutaneous every 12 hours                            12.4   5.56  )-----------( 171      ( 05 Apr 2024 05:45 )             38.0       04-05    141  |  107  |  25<H>  ----------------------------<  93  4.2   |  21<L>  |  0.64    Ca    9.2      05 Apr 2024 05:45  Phos  2.8     04-05  Mg     2.30     04-05    TPro  6.8  /  Alb  3.9  /  TBili  <0.2  /  DBili  x   /  AST  20  /  ALT  8   /  AlkPhos  77  04-04            T(C): 36.8 (04-05-24 @ 09:39), Max: 37.1 (04-04-24 @ 20:00)  HR: 57 (04-05-24 @ 09:39) (53 - 57)  BP: 133/59 (04-05-24 @ 09:39) (124/59 - 153/53)  RR: 18 (04-05-24 @ 09:39) (17 - 18)  SpO2: 96% (04-05-24 @ 09:39) (96% - 98%)  Wt(kg): --    I&O's Summary      General: Well nourished in no acute distress. Alert and Oriented * 3.   Head: Normocephalic and atraumatic.   Neck: No JVD. No bruits. Supple. Does not appear to be enlarged.   Cardiovascular: + S1,S2 ; RRR Soft systolic murmur at the left lower sternal border. No rubs noted.    Lungs: CTA b/l. No rhonchi, rales or wheezes.   Abdomen: + BS, soft. Non tender. Non distended. No rebound. No guarding.   Extremities: No clubbing/cyanosis/edema.   Neurologic: Moves all four extremities. Full range of motion.   Skin: Warm and moist. The patient's skin has normal elasticity and good skin turgor.   Psychiatric: Appropriate mood and affect.  Musculoskeletal: Normal range of motion, normal strength        echo unremarkable LVEF    A/P) 80 y/o female PMH hypertension and hyperthyroidism a/w palpitations and presyncope. EP called for sinus bradycardia - patient denies presyncope, syncope, nor exercise intolerance. LVEF remains normal and QRS remains narrow    -continue norvasc for hypertension  -continue lipitor for hyperlipidemia  -no obvious indication for PPM at this time  -workup of chest pain as per cardiology      Kirt Jackson M.D., [facility]  Cardiac Electrophysiology  [facility]  [address] [place] [address], [address] [place] [address]  [email or web address]    office [phone]  pager [phone]  
Patient is a 79y old  Female who presents with a chief complaint of palpitations (05 Apr 2024 14:29)    Date of servie : 04-05-24 @ 16:34  INTERVAL HPI/OVERNIGHT EVENTS:  T(C): 36.9 (04-05-24 @ 13:35), Max: 37.1 (04-04-24 @ 20:00)  HR: 59 (04-05-24 @ 13:35) (53 - 59)  BP: 151/70 (04-05-24 @ 13:35) (124/59 - 153/53)  RR: 17 (04-05-24 @ 13:35) (17 - 18)  SpO2: 100% (04-05-24 @ 13:35) (96% - 100%)  Wt(kg): --  I&O's Summary      LABS:                        12.4   5.56  )-----------( 171      ( 05 Apr 2024 05:45 )             38.0     04-05    141  |  107  |  25<H>  ----------------------------<  93  4.2   |  21<L>  |  0.64    Ca    9.2      05 Apr 2024 05:45  Phos  2.8     04-05  Mg     2.30     04-05    TPro  6.8  /  Alb  3.9  /  TBili  <0.2  /  DBili  x   /  AST  20  /  ALT  8   /  AlkPhos  77  04-04    PT/INR - ( 04 Apr 2024 06:31 )   PT: 11.6 sec;   INR: 1.03 ratio         PTT - ( 04 Apr 2024 06:31 )  PTT:30.9 sec  Urinalysis Basic - ( 05 Apr 2024 05:45 )    Color: x / Appearance: x / SG: x / pH: x  Gluc: 93 mg/dL / Ketone: x  / Bili: x / Urobili: x   Blood: x / Protein: x / Nitrite: x   Leuk Esterase: x / RBC: x / WBC x   Sq Epi: x / Non Sq Epi: x / Bacteria: x      CAPILLARY BLOOD GLUCOSE            Urinalysis Basic - ( 05 Apr 2024 05:45 )    Color: x / Appearance: x / SG: x / pH: x  Gluc: 93 mg/dL / Ketone: x  / Bili: x / Urobili: x   Blood: x / Protein: x / Nitrite: x   Leuk Esterase: x / RBC: x / WBC x   Sq Epi: x / Non Sq Epi: x / Bacteria: x        MEDICATIONS  (STANDING):  amLODIPine   Tablet 5 milliGRAM(s) Oral daily  atorvastatin 20 milliGRAM(s) Oral at bedtime  heparin   Injectable 5000 Unit(s) SubCutaneous every 12 hours    MEDICATIONS  (PRN):  acetaminophen     Tablet .. 650 milliGRAM(s) Oral every 6 hours PRN Temp greater or equal to 38C (100.4F), Mild Pain (1 - 3)          PHYSICAL EXAM:  GENERAL: NAD, well-groomed, well-developed  HEAD:  Atraumatic, Normocephalic  CHEST/LUNG: Clear to percussion bilaterally; No rales, rhonchi, wheezing, or rubs  HEART: Regular rate and rhythm; No murmurs, rubs, or gallops  ABDOMEN: Soft, Nontender, Nondistended; Bowel sounds present  EXTREMITIES:  2+ Peripheral Pulses, No clubbing, cyanosis, or edema  LYMPH: No lymphadenopathy noted  SKIN: No rashes or lesions    Care Discussed with Consultants/Other Providers [ ] YES  [ ] NO

## 2024-04-05 NOTE — CHART NOTE - NSCHARTNOTEFT_GEN_A_CORE
[facility] NIGHT MEDICINE COVERAGE.    Notified by RN, pt sustaining bradycardic 40-50s while asleep throughout the night. Now notified that tele is showing pt may be in Afib. EKG ordered and personally reviewed showing Irregular rhythm [facility] [employer]IC[employer].    Notified by RN, pt sustaining bradycardic 40-50s while asleep throughout the night. Now notified that tele is showing pt may be in Afib. EKG ordered and interpreted as AFib with Slow RVR to 58bpm. EKG personally reviewed showing bradycardia however with p-waves present. Unlikely AFib, however EP, Attg, Dr. SHERRY Kothari made aware, waiting recs. If likelihood of AFib, pts [id number]: 4 (for age, sex, hx of HTN), would rec AC.     Emilie MATTSON  [facility] [phone] [facility] NIGHT MEDICINE COVERAGE.    Notified by RN, pt sustaining bradycardic 40-50s while asleep throughout the night. Now notified that tele is showing pt may be in Afib. EKG ordered and interpreted as AFib with Slow RVR to 58bpm. EKG personally reviewed showing bradycardia however with p-waves present. Unlikely AFib, however EP, Attg, Dr. SHERRY Kothari made aware, waiting recs. If likelihood of AFib, pts [id number]: 4 (for age, sex, hx of HTN), would rec AC.     Per Dr. SHERRY Kothari, pt still in Sinus Nabeel with short PATs, no need for AC as not in AFib. Nothing further to do. c/w Tele.     Emilie MATTSON  [facility] Medicine [phone]

## 2024-04-05 NOTE — DISCHARGE NOTE PROVIDER - NSDCFUADDAPPT_GEN_ALL_CORE_FT
Kirt Jackson M.D., [facility]  Cardiac Electrophysiology  [facility]  [address] [facility], [address] [place], [place] [id number]  [email or web address]    office [phone] Kirt Jackson M.D., [facility]  Cardiac Electrophysiology  [facility]  [address] [place] [address], [address] [place] [address]  [email or web address]    office [phone]   Or Follow up with your Doctor from [facility] , call for appointment

## 2024-04-05 NOTE — PROGRESS NOTE ADULT - ASSESSMENT
79-year-old female hx HTN, hyperthyroidism prior (now resolved), here w/ palpitations and chest pressure.      Problem/Plan - 1:  ·  Problem: Palpitations.   ·  Plan: -TTE reviewed   -monitor on tele  - now with bradycardia   - ep and cards fu     Problem/Plan - 2:  ·  Problem: Mild HTN.   ·  Plan: cw current meds   - DASH diet

## 2024-04-05 NOTE — DISCHARGE NOTE PROVIDER - HOSPITAL COURSE
Pt is a 79-year-old female hx HTN, hyperthyroidism prior (now resolved), here w/ palpitations and chest pressure. Patient was at urgent care yesterday and was send in for bradycardia and chest pressure. She also reports palpations at the same time. Reports associated dizziness and lightheadedness. Denies any syncope, reports bradycardia and dry skin. Chest pressure has now resolved, no associated SOB, orthopnea, PND or LE edema. Evaluated by cardiologist and EP physicians.      Chest Pressure/Dizziness/Bradycardia/Palpiations/HTN/HLD  - EKG with sinus lola and PAC  - Tele with sinus lola and PAC  - TSH WNL  - Appropriate HR response to activity  - TTE normal LVEF and no RWMA  - orthostats negative  - NST with no perfusion deficits  - outpatient HOLTER  - c/w Valsartan, HCTZ and Amlodipine for BP  - c/w Statin    Sarika aVlentin MD  Pager: [phone]   Kirt Jackson M.D., [facility]  Cardiac Electrophysiology  [facility]  [address] [place] [address], [place] [address] Pt is a 79-year-old female hx HTN, hyperthyroidism prior (now resolved), here w/ palpitations and chest pressure. Patient was at urgent care yesterday and was send in for bradycardia and chest pressure. She also reports palpations at the same time. Reports associated dizziness and lightheadedness. Denies any syncope, reports bradycardia and dry skin. Chest pressure has now resolved, no associated SOB, orthopnea, PND or LE edema. Evaluated by cardiologist and EP physicians.      Chest Pressure/Dizziness/Bradycardia/Palpiations/HTN/HLD  - EKG with sinus lola and PAC  - Tele with sinus lola and PAC  - TSH WNL  - Appropriate HR response to activity  - TTE normal LVEF and no RWMA  - orthostats negative  - NST with no perfusion deficits  - outpatient HOLTER  - c/w Valsartan, HCTZ and Amlodipine for BP  - c/w Statin    Sarika Valentin MD  Pager: [phone]   Kirt Jackson M.D., [facility]  Cardiac Electrophysiology  [facility]  [address] [place] [address], [place] [address]    On 4/5/24 this case was reviewed with  ____, the patient is medically stable and optimized for discharge. All medications were reviewed and prescriptions were sent to mutually agreed upon pharmacy.

## 2024-04-05 NOTE — DISCHARGE NOTE PROVIDER - NSDCMRMEDTOKEN_GEN_ALL_CORE_FT
amLODIPine 10 mg oral tablet: 1 tab(s) orally once a day  Caltrate 600 + D oral tablet: 1 tab(s) orally 2 times a day  valsartan-hydrochlorothiazide 160 mg-12.5 mg oral tablet: 1 tab(s) orally once a day   amLODIPine 10 mg oral tablet: 1 tab(s) orally once a day  atorvastatin 20 mg oral tablet: 1 tab(s) orally once a day (at bedtime)  Caltrate 600 + D oral tablet: 1 tab(s) orally 2 times a day  valsartan-hydrochlorothiazide 160 mg-12.5 mg oral tablet: 1 tab(s) orally once a day

## 2024-04-05 NOTE — DISCHARGE NOTE NURSING/CASE MANAGEMENT/SOCIAL WORK - PATIENT PORTAL LINK FT
You can access the FollowMyHealth Patient Portal offered by [facility] by registering at the following website: [email or web address]. By joining [facility]’s FollowMyHealth portal, you will also be able to view your health information using other applications (apps) compatible with our system.

## 2024-04-05 NOTE — PATIENT PROFILE ADULT - FALL HARM RISK - HARM RISK INTERVENTIONS

## 2024-04-05 NOTE — DISCHARGE NOTE PROVIDER - NSDCCPCAREPLAN_GEN_ALL_CORE_FT
PRINCIPAL DISCHARGE DIAGNOSIS  Diagnosis: Palpitations  Assessment and Plan of Treatment: You preseted to ER with complaining of chest pain and palpitalitions. You were seen by cardilogist and EP physician. Trest test was negative, EKG normal. Plerase follow up with cardiologist oupatients within one week. Call your doctor immediately if youy developed any chest pain, palpitation, shortness of breath or fever.      SECONDARY DISCHARGE DIAGNOSES  Diagnosis: Mild HTN  Assessment and Plan of Treatment:      PRINCIPAL DISCHARGE DIAGNOSIS  Diagnosis: Palpitations  Assessment and Plan of Treatment: You preseted to ER with complaining of chest pain and palpitalitions. You were seen by cardilogist and EP physician. Trest test was negative, EKG normal. Plerase follow up with cardiologist oupatients within one week. Call your doctor immediately if youy developed any chest pain, palpitation, shortness of breath or fever. You are able ti travel via plane      SECONDARY DISCHARGE DIAGNOSES  Diagnosis: Mild HTN  Assessment and Plan of Treatment: Low salt diet  Activity as tolerated.  Take all medication as prescribed.  Follow up with your medical doctor for routine blood pressure monitoring at your next visit.  Notify your doctor if you have any of the following symptoms:   Dizziness, Lightheadedness, Blurry vision, Headache, Chest pain, Shortness of breath

## 2024-04-05 NOTE — DISCHARGE NOTE PROVIDER - CARE PROVIDER_API CALL
Sarika Valentin  Interventional Cardiology  [address], [address]  [place] [address]  Phone: [phone]  Fax: [phone]  Follow Up Time:

## 2025-04-09 ENCOUNTER — APPOINTMENT (OUTPATIENT)
Dept: ORTHOPEDIC SURGERY | Facility: CLINIC | Age: 80
End: 2025-04-09

## 2025-04-09 DIAGNOSIS — M19.042 PRIMARY OSTEOARTHRITIS, RIGHT HAND: ICD-10-CM

## 2025-04-09 DIAGNOSIS — M65.332 TRIGGER FINGER, LEFT MIDDLE FINGER: ICD-10-CM

## 2025-04-09 DIAGNOSIS — G56.02 CARPAL TUNNEL SYNDROME, LEFT UPPER LIMB: ICD-10-CM

## 2025-04-09 DIAGNOSIS — G56.01 CARPAL TUNNEL SYNDROME, RIGHT UPPER LIMB: ICD-10-CM

## 2025-04-09 DIAGNOSIS — M65.312 TRIGGER THUMB, LEFT THUMB: ICD-10-CM

## 2025-04-09 DIAGNOSIS — M65.322 TRIGGER FINGER, LEFT INDEX FINGER: ICD-10-CM

## 2025-04-09 DIAGNOSIS — E78.00 PURE HYPERCHOLESTEROLEMIA, UNSPECIFIED: ICD-10-CM

## 2025-04-09 DIAGNOSIS — M19.041 PRIMARY OSTEOARTHRITIS, RIGHT HAND: ICD-10-CM

## 2025-04-09 DIAGNOSIS — M65.331 TRIGGER FINGER, RIGHT MIDDLE FINGER: ICD-10-CM

## 2025-04-09 DIAGNOSIS — I10 ESSENTIAL (PRIMARY) HYPERTENSION: ICD-10-CM

## 2025-04-09 PROCEDURE — 99204 OFFICE O/P NEW MOD 45 MIN: CPT | Mod: 25

## 2025-04-09 PROCEDURE — 20550 NJX 1 TENDON SHEATH/LIGAMENT: CPT | Mod: 59,LT

## 2025-04-09 PROCEDURE — 73130 X-RAY EXAM OF HAND: CPT | Mod: 50

## 2025-04-09 PROCEDURE — L3908: CPT | Mod: RT

## 2025-05-21 ENCOUNTER — APPOINTMENT (OUTPATIENT)
Dept: ORTHOPEDIC SURGERY | Facility: CLINIC | Age: 80
End: 2025-05-21
Payer: MEDICARE

## 2025-05-21 DIAGNOSIS — M65.331 TRIGGER FINGER, RIGHT MIDDLE FINGER: ICD-10-CM

## 2025-05-21 DIAGNOSIS — M65.332 TRIGGER FINGER, LEFT MIDDLE FINGER: ICD-10-CM

## 2025-05-21 DIAGNOSIS — M19.042 PRIMARY OSTEOARTHRITIS, RIGHT HAND: ICD-10-CM

## 2025-05-21 DIAGNOSIS — G56.01 CARPAL TUNNEL SYNDROME, RIGHT UPPER LIMB: ICD-10-CM

## 2025-05-21 DIAGNOSIS — M65.322 TRIGGER FINGER, LEFT INDEX FINGER: ICD-10-CM

## 2025-05-21 DIAGNOSIS — M19.041 PRIMARY OSTEOARTHRITIS, RIGHT HAND: ICD-10-CM

## 2025-05-21 DIAGNOSIS — M65.312 TRIGGER THUMB, LEFT THUMB: ICD-10-CM

## 2025-05-21 DIAGNOSIS — G56.02 CARPAL TUNNEL SYNDROME, LEFT UPPER LIMB: ICD-10-CM

## 2025-05-21 PROCEDURE — 99212 OFFICE O/P EST SF 10 MIN: CPT
